# Patient Record
Sex: MALE | Race: OTHER | HISPANIC OR LATINO | Employment: FULL TIME | URBAN - METROPOLITAN AREA
[De-identification: names, ages, dates, MRNs, and addresses within clinical notes are randomized per-mention and may not be internally consistent; named-entity substitution may affect disease eponyms.]

---

## 2019-05-23 ENCOUNTER — HOSPITAL ENCOUNTER (OUTPATIENT)
Dept: LAB | Facility: HOSPITAL | Age: 39
Discharge: HOME | End: 2019-05-23

## 2019-05-23 LAB
25(OH)D3 SERPL-MCNC: 18 NG/ML (ref 30–100)
ALBUMIN SERPL-MCNC: 4.5 G/DL (ref 3.5–5)
ALBUMIN/GLOB SERPL: 1.6 {RATIO} (ref 1.4–2.6)
ALP SERPL-CCNC: 65 U/L (ref 53–128)
ALT SERPL-CCNC: 125 U/L (ref 10–40)
ANION GAP SERPL CALC-SCNC: 18 MMOL/L (ref 8–19)
AST SERPL-CCNC: 87 U/L (ref 15–50)
BASOPHILS # BLD AUTO: 0.08 10*3/UL (ref 0–0.2)
BASOPHILS NFR BLD AUTO: 1.1 % (ref 0–3)
BILIRUB SERPL-MCNC: 0.26 MG/DL (ref 0.2–1.3)
BUN SERPL-MCNC: 13 MG/DL (ref 5–25)
BUN/CREAT SERPL: 13 {RATIO} (ref 6–20)
CALCIUM SERPL-MCNC: 9.6 MG/DL (ref 8.7–10.4)
CHLORIDE SERPL-SCNC: 104 MMOL/L (ref 99–111)
CHOLEST SERPL-MCNC: 234 MG/DL (ref 107–200)
CHOLEST/HDLC SERPL: 5.9 {RATIO} (ref 3–6)
CONV ABS IMM GRAN: 0.01 10*3/UL (ref 0–0.2)
CONV CO2: 25 MMOL/L (ref 22–32)
CONV IMMATURE GRAN: 0.1 % (ref 0–1.8)
CONV TOTAL PROTEIN: 7.4 G/DL (ref 6.3–8.2)
CREAT UR-MCNC: 0.98 MG/DL (ref 0.7–1.2)
DEPRECATED RDW RBC AUTO: 46.5 FL (ref 35.1–43.9)
EOSINOPHIL # BLD AUTO: 0.29 10*3/UL (ref 0–0.7)
EOSINOPHIL # BLD AUTO: 3.9 % (ref 0–7)
ERYTHROCYTE [DISTWIDTH] IN BLOOD BY AUTOMATED COUNT: 13.2 % (ref 11.6–14.4)
EST. AVERAGE GLUCOSE BLD GHB EST-MCNC: 108 MG/DL
GFR SERPLBLD BASED ON 1.73 SQ M-ARVRAT: >60 ML/MIN/{1.73_M2}
GLOBULIN UR ELPH-MCNC: 2.9 G/DL (ref 2–3.5)
GLUCOSE SERPL-MCNC: 106 MG/DL (ref 70–99)
HBA1C MFR BLD: 16.3 G/DL (ref 14–18)
HBA1C MFR BLD: 5.4 % (ref 3.5–5.7)
HCT VFR BLD AUTO: 46.8 % (ref 42–52)
HDLC SERPL-MCNC: 40 MG/DL (ref 40–60)
LDLC SERPL CALC-MCNC: 117 MG/DL (ref 70–100)
LYMPHOCYTES # BLD AUTO: 3.12 10*3/UL (ref 1–5)
MCH RBC QN AUTO: 33.3 PG (ref 27–31)
MCHC RBC AUTO-ENTMCNC: 34.8 G/DL (ref 33–37)
MCV RBC AUTO: 95.5 FL (ref 80–96)
MONOCYTES # BLD AUTO: 0.53 10*3/UL (ref 0.2–1.2)
MONOCYTES NFR BLD AUTO: 7.2 % (ref 3–10)
NEUTROPHILS # BLD AUTO: 3.37 10*3/UL (ref 2–8)
NEUTROPHILS NFR BLD AUTO: 45.5 % (ref 30–85)
NRBC CBCN: 0 % (ref 0–0.7)
OSMOLALITY SERPL CALC.SUM OF ELEC: 297 MOSM/KG (ref 273–304)
PLATELET # BLD AUTO: 234 10*3/UL (ref 130–400)
PMV BLD AUTO: 11.5 FL (ref 9.4–12.4)
POTASSIUM SERPL-SCNC: 3.9 MMOL/L (ref 3.5–5.3)
RBC # BLD AUTO: 4.9 10*6/UL (ref 4.7–6.1)
SODIUM SERPL-SCNC: 143 MMOL/L (ref 135–147)
T4 FREE SERPL-MCNC: 1.3 NG/DL (ref 0.9–1.8)
TRIGL SERPL-MCNC: 383 MG/DL (ref 40–150)
TSH SERPL-ACNC: 1.51 M[IU]/L (ref 0.27–4.2)
VARIANT LYMPHS NFR BLD MANUAL: 42.2 % (ref 20–45)
VLDLC SERPL-MCNC: 77 MG/DL (ref 5–37)
WBC # BLD AUTO: 7.4 10*3/UL (ref 4.8–10.8)

## 2020-07-22 ENCOUNTER — HOSPITAL ENCOUNTER (OUTPATIENT)
Dept: NUCLEAR MEDICINE | Facility: HOSPITAL | Age: 40
Discharge: HOME | End: 2020-07-22

## 2021-04-08 ENCOUNTER — HOSPITAL ENCOUNTER (OUTPATIENT)
Dept: URGENT CARE | Facility: CLINIC | Age: 41
Discharge: HOME | End: 2021-04-08
Attending: EMERGENCY MEDICINE

## 2021-04-08 LAB — SARS-COV-2 RNA SPEC QL NAA+PROBE: NOT DETECTED

## 2021-04-10 LAB — BACTERIA SPEC AEROBE CULT: NORMAL

## 2023-03-07 ENCOUNTER — OFFICE VISIT (OUTPATIENT)
Dept: NEUROSURGERY | Facility: CLINIC | Age: 43
End: 2023-03-07
Payer: COMMERCIAL

## 2023-03-07 VITALS
HEART RATE: 87 BPM | WEIGHT: 220.5 LBS | HEIGHT: 72 IN | SYSTOLIC BLOOD PRESSURE: 137 MMHG | DIASTOLIC BLOOD PRESSURE: 82 MMHG | BODY MASS INDEX: 29.87 KG/M2

## 2023-03-07 DIAGNOSIS — M47.27 OSTEOARTHRITIS OF SPINE WITH RADICULOPATHY, LUMBOSACRAL REGION: ICD-10-CM

## 2023-03-07 DIAGNOSIS — M43.10 PARS DEFECT WITH SPONDYLOLISTHESIS: Primary | ICD-10-CM

## 2023-03-07 DIAGNOSIS — M43.00 PARS DEFECT WITH SPONDYLOLISTHESIS: Primary | ICD-10-CM

## 2023-03-07 PROCEDURE — 99204 OFFICE O/P NEW MOD 45 MIN: CPT | Performed by: NURSE PRACTITIONER

## 2023-03-07 NOTE — PROGRESS NOTES
"Chief Complaint  Back Pain (Low back pain that radiates down bilateral legs into feet) and Tingling (Left leg & feet)    Subjective          Shankar Ledezma who is a 42 y.o. year old male who presents to [facility] for evaluation of lumbar spine.      The patient complains of pain located in the lumbar spine.  Patients states the pain has been present for several years.  The pain came on gradually.  The pain scale level is 9.  The pain does radiate. Dermatomes are located bilaterally Lumbar at: a non-specific dermatome..  Pain will radiate into the entirety of the legs to all the toes. This worse on the left. The pain is waxing/waning and described as sharp, aching, burning and tingling.  The pain is worse at no particular time of day. Patient states prolonged standing, prolonged sitting and prolonged walking makes the pain worse.  Patient states nothing makes the pain better.    Associated Symptoms Include: Numbness, Tingling and Weakness  Conservative Interventions Include: Epidural Steroids that were ineffective. and Gabapentin that was not very effective. Recently started Lyrica though unsure this is providing much relief. He had physical therapy a few years ago which did not help. Recently received SI joint injections which provided a short duration of benefit.     Was this the result of an injury or accident?: No    History of Previous Spinal Surgery?: No    Nicotine use: smoker  (0.5-1 ppd)    BMI: Body mass index is 29.91 kg/m².    He works with [employer], performing heavy lifting of car parts.    Review of Systems   Musculoskeletal: Positive for arthralgias, back pain, gait problem and myalgias.   Neurological: Positive for weakness and numbness.   All other systems reviewed and are negative.       Objective   Vital Signs:   /82 (BP Location: Left arm, Patient Position: Sitting, Cuff Size: Large Adult)   Pulse 87   Ht 182.9 cm (72\")   Wt 100 kg (220 " lb 8 oz)   BMI 29.91 kg/m²       Physical Exam  Vitals reviewed.   Constitutional:       Appearance: Normal appearance.   Musculoskeletal:      Lumbar back: No tenderness. Negative right straight leg raise test and negative left straight leg raise test.      Right hip: No tenderness. Normal range of motion.      Left hip: No tenderness. Normal range of motion.   Neurological:      Mental Status: He is alert and oriented to person, place, and time.      Motor: Motor strength is normal.      Gait: Gait is intact.      Deep Tendon Reflexes:      Reflex Scores:       Patellar reflexes are 2+ on the right side and 2+ on the left side.       Achilles reflexes are 2+ on the right side and 2+ on the left side.       Neurologic Exam     Mental Status   Oriented to person, place, and time.   Level of consciousness: alert    Motor Exam   Muscle bulk: normal  Overall muscle tone: normal    Strength   Strength 5/5 throughout.     Sensory Exam   Light touch normal.     Gait, Coordination, and Reflexes     Gait  Gait: normal    Reflexes   Right patellar: 2+  Left patellar: 2+  Right achilles: 2+  Left achilles: 2+  Right ankle clonus: absent  Left ankle clonus: absent       Result Review :       Data reviewed: Radiologic studies MRI Lumbar Spine on 2/14/23 at [facility] personally reviewed. Multilevel degenerative changes. At L5/S1 there is bilateral L5 pars defects with 10-11 mm anterolisthesis, resultingi n severe bilateral foraminal stenosis with effacement of the exiting L5 nerve roots, though no significant spinal canal stenosis. Overall similar appearance to prior imaging from 2020.          Assessment and Plan    Diagnoses and all orders for this visit:    1. Pars defect with spondylolisthesis (Primary)    2. Osteoarthritis of spine with radiculopathy, lumbosacral region    Pt presenting for evaluation of chronic low back pain with radiculopathy. We reviewed his MRI Lumbar Spine, demonstrating L5 pars defects with  Grade 2 anterolisthesis of L5 on S1. He has degenerative changes throughout the lumbar spine. Dr. Lara reviewed his MRI. Discussed posterior lumbar fusion L5/S1. Risks and benefits discussed. Pt would be higher risk for complications due to multilevel spondylosis. We discussed expected recovery and mobility restrictions. Pt would like to proceed with surgery.     Pt would need to stop smoking.     We discussed the importance of smoking/nicotine cessation. Smoking/nicotine use has multiple health risks. In particular related to the spine, nicotine increases the incidence of lower back pain, speeds up the progression of degenerative disc disease and dramatically reduces healing after spine surgery (particularly a fusion operation).     Pt is willing to quit. He will call when he is off nicotine and we will order nicotine screening with plans to proceed with Lumbar fusion.      Follow Up   Return if symptoms worsen or fail to improve.  Patient was given instructions and counseling regarding his condition or for health maintenance advice.

## 2023-04-03 ENCOUNTER — TELEPHONE (OUTPATIENT)
Dept: NEUROSURGERY | Facility: CLINIC | Age: 43
End: 2023-04-03
Payer: COMMERCIAL

## 2023-04-03 DIAGNOSIS — Z72.0 TOBACCO ABUSE: Primary | ICD-10-CM

## 2023-04-03 NOTE — TELEPHONE ENCOUNTER
"    Caller: Shankar Ledezma    Relationship: Self    Best call back number: [phone]    What orders are you requesting (i.e. lab or imaging): NICOTINE SCREENING    Additional notes:     PATIENT CALLED IN AND STATES HE HAS NOT BEEN SMOKING FOR ABOUT 3 TO 4 WEEKS NOW.  PER LAST OVN:  \"Pt is willing to quit. He will call when he is off nicotine and we will order nicotine screening with plans to proceed with Lumbar fusion.\"    PLEASE CALL PATIENT WHEN THE ORDER HAS BEEN SENT.  THANK YOU!  "

## 2023-04-11 ENCOUNTER — LAB (OUTPATIENT)
Dept: LAB | Facility: HOSPITAL | Age: 43
End: 2023-04-11
Payer: COMMERCIAL

## 2023-04-11 DIAGNOSIS — Z72.0 TOBACCO ABUSE: ICD-10-CM

## 2023-04-11 PROCEDURE — G0480 DRUG TEST DEF 1-7 CLASSES: HCPCS

## 2023-04-11 PROCEDURE — 36415 COLL VENOUS BLD VENIPUNCTURE: CPT

## 2023-04-11 NOTE — TELEPHONE ENCOUNTER
Shankar called and asked about nicotine test. Adv order has been placed and pt can go in at any outpatient lab and have this done.     Adv pt of [facility] location and time.

## 2023-04-14 LAB
COTININE SERPL-MCNC: <1 NG/ML
NICOTINE SERPL-MCNC: <1 NG/ML

## 2023-04-20 ENCOUNTER — PREP FOR SURGERY (OUTPATIENT)
Dept: OTHER | Facility: HOSPITAL | Age: 43
End: 2023-04-20
Payer: COMMERCIAL

## 2023-04-20 DIAGNOSIS — M43.10 PARS DEFECT WITH SPONDYLOLISTHESIS: Primary | ICD-10-CM

## 2023-04-20 DIAGNOSIS — M43.00 PARS DEFECT WITH SPONDYLOLISTHESIS: Primary | ICD-10-CM

## 2023-04-20 PROBLEM — M43.16 SPONDYLOLISTHESIS OF LUMBAR REGION: Status: ACTIVE | Noted: 2023-04-20

## 2023-04-20 RX ORDER — CEFAZOLIN SODIUM 2 G/100ML
2 INJECTION, SOLUTION INTRAVENOUS ONCE
OUTPATIENT
Start: 2023-04-20 | End: 2023-04-20

## 2023-05-31 PROBLEM — M43.10 PARS DEFECT WITH SPONDYLOLISTHESIS: Status: ACTIVE | Noted: 2023-05-31

## 2023-06-29 PROBLEM — Z78.9 DECREASED ACTIVITIES OF DAILY LIVING (ADL): Status: ACTIVE | Noted: 2023-06-29

## 2023-08-08 ENCOUNTER — OFFICE VISIT (OUTPATIENT)
Dept: NEUROSURGERY | Facility: CLINIC | Age: 43
End: 2023-08-08
Payer: COMMERCIAL

## 2023-08-08 VITALS
SYSTOLIC BLOOD PRESSURE: 148 MMHG | HEIGHT: 72 IN | BODY MASS INDEX: 30.94 KG/M2 | WEIGHT: 228.4 LBS | DIASTOLIC BLOOD PRESSURE: 91 MMHG

## 2023-08-08 DIAGNOSIS — Z98.1 S/P LUMBAR SPINAL FUSION: Primary | ICD-10-CM

## 2023-08-08 DIAGNOSIS — M43.10 PARS DEFECT WITH SPONDYLOLISTHESIS: ICD-10-CM

## 2023-08-08 PROCEDURE — 99024 POSTOP FOLLOW-UP VISIT: CPT | Performed by: NEUROLOGICAL SURGERY

## 2023-08-08 NOTE — PROGRESS NOTES
Shankar Ledezma is a 43 y.o. male that presents with Back Pain       He is overall improving, now 6 weeks out from lumbar fusion. The symptoms into the arch of the foot are resolved.     Back Pain      Review of Systems   Musculoskeletal:  Positive for back pain.      Vitals:    08/08/23 1105   BP: 148/91        Physical Exam  Skin:     Comments: WHSS   Neurological:      Mental Status: He is alert.      Sensory: No sensory deficit.      Motor: No weakness.   Psychiatric:         Mood and Affect: Mood normal.           Assessment and Plan {CC Problem List  Visit Diagnosis  ROS  Review (Popup)  [facility] Health Maintenance  Quality  BestPractice  Medications  SmartSets  SnapShot Encounters  Media :23}   Problem List Items Addressed This Visit          Musculoskeletal and Injuries    Pars defect with spondylolisthesis    Overview     Added automatically from request for surgery [id number]         Relevant Orders    XR Spine Lumbar 2 or 3 View     Other Visit Diagnoses       S/P lumbar spinal fusion    -  Primary    Relevant Orders    XR Spine Lumbar 2 or 3 View          He will f/u in 6 weeks with repeat films of the lumbar films.   Follow Up {Instructions Charge Capture  Follow-up Communications :23}   Return in about 6 weeks (around 9/19/2023).

## 2023-08-24 DIAGNOSIS — M43.16 SPONDYLOLISTHESIS OF LUMBAR REGION: ICD-10-CM

## 2023-08-24 DIAGNOSIS — M43.10 PARS DEFECT WITH SPONDYLOLISTHESIS: ICD-10-CM

## 2023-08-24 RX ORDER — OXYCODONE HYDROCHLORIDE AND ACETAMINOPHEN 5; 325 MG/1; MG/1
1 TABLET ORAL EVERY 12 HOURS PRN
Qty: 30 TABLET | Refills: 0 | Status: SHIPPED | OUTPATIENT
Start: 2023-08-24

## 2023-09-14 ENCOUNTER — HOSPITAL ENCOUNTER (OUTPATIENT)
Dept: GENERAL RADIOLOGY | Facility: HOSPITAL | Age: 43
Discharge: HOME | End: 2023-09-14
Admitting: NEUROLOGICAL SURGERY
Payer: COMMERCIAL

## 2023-09-14 DIAGNOSIS — M43.10 PARS DEFECT WITH SPONDYLOLISTHESIS: ICD-10-CM

## 2023-09-14 DIAGNOSIS — Z98.1 S/P LUMBAR SPINAL FUSION: ICD-10-CM

## 2023-09-14 PROCEDURE — 72100 X-RAY EXAM L-S SPINE 2/3 VWS: CPT

## 2023-09-19 ENCOUNTER — OFFICE VISIT (OUTPATIENT)
Dept: NEUROSURGERY | Facility: CLINIC | Age: 43
End: 2023-09-19
Payer: COMMERCIAL

## 2023-09-19 VITALS
SYSTOLIC BLOOD PRESSURE: 144 MMHG | WEIGHT: 224.3 LBS | HEIGHT: 72 IN | BODY MASS INDEX: 30.38 KG/M2 | DIASTOLIC BLOOD PRESSURE: 99 MMHG

## 2023-09-19 DIAGNOSIS — Z98.1 S/P LUMBAR SPINAL FUSION: Primary | ICD-10-CM

## 2023-09-19 PROCEDURE — 99024 POSTOP FOLLOW-UP VISIT: CPT | Performed by: NEUROLOGICAL SURGERY

## 2023-09-19 NOTE — PROGRESS NOTES
Shankar Ledezma is a 43 y.o. male that presents with S/P lumbar spinal fusion       He was doing well until yesterday and the pain flared up in the lower back. No trauma, but did go to two soccer games and threw out the trash. No numbness or tingling. It is worse with being up an driving.       Review of Systems   Musculoskeletal:  Positive for back pain.   Neurological:  Negative for numbness.      Vitals:    09/19/23 1259   BP: 144/99        Physical Exam  Constitutional:       Appearance: He is normal weight.   Neurological:      Mental Status: He is alert.      Sensory: No sensory deficit.      Comments: Pain into the lower back with SLR testing on the left and strength testing on the left   Psychiatric:         Mood and Affect: Mood normal.      I have personally reviewed the patient's x-rays which show good postioning of L5-S1 instrumentation.        Assessment and Plan {CC Problem List  Visit Diagnosis  ROS  Review (Popup)  Health Maintenance  Quality  BestPractice  Medications  SmartSets  SnapShot Encounters  Media :23}   Problem List Items Addressed This Visit    None  Visit Diagnoses       S/P lumbar spinal fusion    -  Primary        He has had a flare up of lower back pain. I have asked him to be good about his activity over the next few days. He would also potentially benefit from heat or ice to the lower back.     I will see him in about 3 months. He will call back in about 3 weeks if this current flare up has not improved.     Have recommended he not lift more than 15 lbs for a period of 3 additional months. He could potentially return to work prior to 3 months from now, if restrictions are adhered to.     Follow Up {Instructions Charge Capture  Follow-up Communications :23}   No follow-ups on file.

## 2023-09-20 ENCOUNTER — TELEPHONE (OUTPATIENT)
Dept: NEUROSURGERY | Facility: CLINIC | Age: 43
End: 2023-09-20
Payer: COMMERCIAL

## 2023-09-20 NOTE — TELEPHONE ENCOUNTER
"PATIENT: POLY PARKER    PROVIDER: DR. JUAN    REASON: WORK NOTE    PATIENT WILL  ASAP    CALL BACK # [phone]    PATIENT CALLED STATING THAT HIS EMPLOYER IS REQUESTING A NEW WORK NOTE THAT IS MORE EXPLICIT WITH WHETHER OR NOT THE PATIENT CAN RETURN TO WORK NOW, OR IF NOT, WHEN EXACTLY HE WILL BE ABLE TO RETURN TO WORK, AND WHAT ARE THE EXACT QUALIFICATION AND LIMITATIONS HE HAS ONCE HE DOES RETURN TO WORK. THE CURRENT NOTE HAS IT THAT HE CAN \"POTENTIALLY RETURN TO WORK IN THE NEXT THREE MONTHS\" BUT, HIS EMPLOYER THINKS THIS IS TOO VAGUE AND WANTS A YES OR NO AND MORE CLARITY. PLEASE PREPARE THE NOTE FOR THE PATIENT TO COME AND PICK IT UP ASAP. THANKS.  "

## 2023-09-20 NOTE — TELEPHONE ENCOUNTER
The patient can return to work with the restriction of no lifting greater than 15 lbs. Minimal bending or twisting at the waist and the ability to sit down during the day as needed.

## 2023-09-22 NOTE — TELEPHONE ENCOUNTER
PATIENT CALLED BACK AND STATES THE RTW NOTE WAS NOT ACCEPTABLE TO HIS EMPLOYER.    EMPLOYER WANTS TIME FRAME FOR HOW LONG ALL RESTRICTIONS ARE.    THEY ARE CURRENTLY NOT LETTING PATIENT RETURN TO WORK UNTIL THAT IS CLARIFIED.    PLEASE CALL TO ADVISE

## 2023-10-13 DIAGNOSIS — M43.10 PARS DEFECT WITH SPONDYLOLISTHESIS: ICD-10-CM

## 2023-10-13 DIAGNOSIS — M43.16 SPONDYLOLISTHESIS OF LUMBAR REGION: ICD-10-CM

## 2023-10-13 RX ORDER — OXYCODONE HYDROCHLORIDE AND ACETAMINOPHEN 5; 325 MG/1; MG/1
1 TABLET ORAL EVERY 12 HOURS PRN
Qty: 6 TABLET | Refills: 0 | Status: SHIPPED | OUTPATIENT
Start: 2023-10-13

## 2023-10-13 NOTE — TELEPHONE ENCOUNTER
Patient called stating that he has went back to work and his pain is unbearable at times. Over the counter pain medication doesn't seem to help.    Shankar is requesting a refill of his pain medication.

## 2023-12-19 ENCOUNTER — OFFICE VISIT (OUTPATIENT)
Dept: NEUROSURGERY | Facility: CLINIC | Age: 43
End: 2023-12-19
Payer: COMMERCIAL

## 2023-12-19 VITALS
HEIGHT: 72 IN | DIASTOLIC BLOOD PRESSURE: 91 MMHG | BODY MASS INDEX: 29.96 KG/M2 | SYSTOLIC BLOOD PRESSURE: 140 MMHG | WEIGHT: 221.2 LBS

## 2023-12-19 DIAGNOSIS — Z98.1 S/P LUMBAR SPINAL FUSION: Primary | ICD-10-CM

## 2023-12-19 NOTE — PROGRESS NOTES
Shankar Ledezma is a 43 y.o. male that presents with S/P lumbar spinal fusion       He is overall doing well. He danced some with his daughter and was sore after. He is overall happy with the result of the surgery.         Review of Systems   Musculoskeletal:  Positive for back pain.        Vitals:    12/19/23 1521   BP: 140/91        Physical Exam  Constitutional:       Comments: BMI 30   Neurological:      Mental Status: He is alert.             Assessment and Plan {CC Problem List  Visit Diagnosis  ROS  Review (Popup)  Health Maintenance  Quality  BestPractice  Medications  SmartSets  SnapShot Encounters  Media :23}   Problem List Items Addressed This Visit    None  Visit Diagnoses       S/P lumbar spinal fusion    -  Primary    Relevant Orders    XR Spine Lumbar 2 or 3 View          He will f/u in 6 months or sooner with any new issues.  Follow Up {Instructions Charge Capture  Follow-up Communications :23}   Return in about 6 months (around 6/19/2024).

## 2024-02-20 ENCOUNTER — TELEPHONE (OUTPATIENT)
Dept: NEUROSURGERY | Facility: CLINIC | Age: 44
End: 2024-02-20
Payer: COMMERCIAL

## 2024-02-20 NOTE — TELEPHONE ENCOUNTER
PT CALLED: HE IS NEEDING AN UPDATED WORK NOTE WITH THE NEW RESTRICTIONS DR. LARA GAVE HIM IN 12/19/23. PLEASE ADVISE! THANKS!    Office Visit with Justino Lara MD (12/19/2023)     SX DATE: 6/26/23 Surgery with Justino Lara MD (06/26/2023)     PT CONTACT: [phone]  BEST TIME TO CALL: ANYTIME

## 2024-05-14 ENCOUNTER — HOSPITAL ENCOUNTER (OUTPATIENT)
Dept: GENERAL RADIOLOGY | Facility: HOSPITAL | Age: 44
Discharge: HOME | End: 2024-05-14
Admitting: NEUROLOGICAL SURGERY
Payer: COMMERCIAL

## 2024-05-14 DIAGNOSIS — Z98.1 S/P LUMBAR SPINAL FUSION: ICD-10-CM

## 2024-05-14 PROCEDURE — 72100 X-RAY EXAM L-S SPINE 2/3 VWS: CPT

## 2024-06-18 ENCOUNTER — OFFICE VISIT (OUTPATIENT)
Dept: NEUROSURGERY | Facility: CLINIC | Age: 44
End: 2024-06-18
Payer: COMMERCIAL

## 2024-06-18 VITALS
HEIGHT: 72 IN | WEIGHT: 218.4 LBS | BODY MASS INDEX: 29.58 KG/M2 | SYSTOLIC BLOOD PRESSURE: 142 MMHG | DIASTOLIC BLOOD PRESSURE: 86 MMHG

## 2024-06-18 DIAGNOSIS — Z98.1 S/P LUMBAR SPINAL FUSION: Primary | ICD-10-CM

## 2024-06-18 PROCEDURE — 99213 OFFICE O/P EST LOW 20 MIN: CPT | Performed by: NEUROLOGICAL SURGERY

## 2024-06-18 NOTE — PROGRESS NOTES
Shankar Ledezma is a 44 y.o. male that presents with S/P lumbar fusion       He is overall doing well, but the last 2 days he has had some increased pain in the right buttock shooting upward. He is nearly a year out from his L5-S1 fusion.         Vitals:    06/18/24 1524   BP: 142/86        Physical Exam  Constitutional:       Appearance: He is normal weight.   Neurological:      Mental Status: He is alert.      Motor: No weakness.   Psychiatric:         Mood and Affect: Mood normal.        I have personally interpreted the patient's x-rays which shows good positioning of his instrumentation.        Assessment and Plan {CC Problem List  Visit Diagnosis  ROS  Review (Popup)  Health Maintenance  Quality  BestPractice  Medications  SmartSets  SnapShot Encounters  Media :23}   Problem List Items Addressed This Visit    None  Visit Diagnoses       S/P lumbar spinal fusion    -  Primary        He will call with any new or worsened pain.     I would recommend he not frequently lift greater than 30 lbs. He will work to avoid excessive bending and twisting at the waist.     Follow Up {Instructions Charge Capture  Follow-up Communications :23}   No follow-ups on file.

## 2025-01-09 ENCOUNTER — APPOINTMENT (OUTPATIENT)
Dept: GENERAL RADIOLOGY | Facility: HOSPITAL | Age: 45
End: 2025-01-09
Payer: OTHER MISCELLANEOUS

## 2025-01-09 ENCOUNTER — HOSPITAL ENCOUNTER (EMERGENCY)
Facility: HOSPITAL | Age: 45
Discharge: HOME | End: 2025-01-09
Attending: EMERGENCY MEDICINE
Payer: OTHER MISCELLANEOUS

## 2025-01-09 VITALS
HEART RATE: 76 BPM | SYSTOLIC BLOOD PRESSURE: 134 MMHG | TEMPERATURE: 98.1 F | HEIGHT: 72 IN | WEIGHT: 235.01 LBS | BODY MASS INDEX: 31.83 KG/M2 | DIASTOLIC BLOOD PRESSURE: 83 MMHG | RESPIRATION RATE: 18 BRPM | OXYGEN SATURATION: 98 %

## 2025-01-09 DIAGNOSIS — M54.50 LUMBAR BACK PAIN: Primary | ICD-10-CM

## 2025-01-09 DIAGNOSIS — M54.32 LEFT SIDED SCIATICA: ICD-10-CM

## 2025-01-09 PROCEDURE — 99283 EMERGENCY DEPT VISIT LOW MDM: CPT

## 2025-01-09 PROCEDURE — 96374 THER/PROPH/DIAG INJ IV PUSH: CPT

## 2025-01-09 PROCEDURE — 72100 X-RAY EXAM L-S SPINE 2/3 VWS: CPT

## 2025-01-09 PROCEDURE — 96372 THER/PROPH/DIAG INJ SC/IM: CPT

## 2025-01-09 PROCEDURE — 25010000002 KETOROLAC TROMETHAMINE PER 15 MG

## 2025-01-09 PROCEDURE — 25010000002 TRIAMCINOLONE PER 10 MG

## 2025-01-09 PROCEDURE — 25010000002 ORPHENADRINE CITRATE PER 60 MG

## 2025-01-09 RX ORDER — KETOROLAC TROMETHAMINE 10 MG/1
10 TABLET, FILM COATED ORAL EVERY 6 HOURS PRN
Qty: 15 TABLET | Refills: 0 | Status: SHIPPED | OUTPATIENT
Start: 2025-01-09

## 2025-01-09 RX ORDER — PREDNISONE 20 MG/1
20 TABLET ORAL DAILY
Qty: 5 TABLET | Refills: 0 | Status: SHIPPED | OUTPATIENT
Start: 2025-01-09

## 2025-01-09 RX ORDER — TRIAMCINOLONE ACETONIDE 40 MG/ML
40 INJECTION, SUSPENSION INTRA-ARTICULAR; INTRAMUSCULAR ONCE
Status: COMPLETED | OUTPATIENT
Start: 2025-01-09 | End: 2025-01-09

## 2025-01-09 RX ORDER — KETOROLAC TROMETHAMINE 30 MG/ML
60 INJECTION, SOLUTION INTRAMUSCULAR; INTRAVENOUS ONCE
Status: DISCONTINUED | OUTPATIENT
Start: 2025-01-09 | End: 2025-01-09

## 2025-01-09 RX ORDER — ORPHENADRINE CITRATE 30 MG/ML
60 INJECTION INTRAMUSCULAR; INTRAVENOUS ONCE
Status: COMPLETED | OUTPATIENT
Start: 2025-01-09 | End: 2025-01-09

## 2025-01-09 RX ORDER — KETOROLAC TROMETHAMINE 30 MG/ML
30 INJECTION, SOLUTION INTRAMUSCULAR; INTRAVENOUS ONCE
Status: COMPLETED | OUTPATIENT
Start: 2025-01-09 | End: 2025-01-09

## 2025-01-09 RX ORDER — ORPHENADRINE CITRATE 100 MG/1
100 TABLET ORAL 2 TIMES DAILY
Qty: 10 TABLET | Refills: 0 | Status: SHIPPED | OUTPATIENT
Start: 2025-01-09

## 2025-01-09 RX ADMIN — TRIAMCINOLONE ACETONIDE 40 MG: 40 SUSPENSION INTRA-ARTERIAL; INTRAMUSCULAR at 14:09

## 2025-01-09 RX ADMIN — ORPHENADRINE CITRATE 60 MG: 60 INJECTION INTRAMUSCULAR; INTRAVENOUS at 14:09

## 2025-01-09 RX ADMIN — KETOROLAC TROMETHAMINE 30 MG: 30 INJECTION, SOLUTION INTRAMUSCULAR; INTRAVENOUS at 14:04

## 2025-01-09 NOTE — DISCHARGE INSTRUCTIONS
Home.  I have sent prescriptions to your pharmacy for pickup.  Please pick these up and take as needed for symptom relief.  Do not start your steroids until tomorrow because you have been given a dose here in the emergency department.  Avoid lifting greater than 10 pounds or bending and twisting excessively until your symptoms have resolved.  Avoid prolonged periods of standing or walking.  Apply ice to your back at least 4 times a day for 15 to 20 minutes each time.    Call Dr. Lara to schedule an appointment for follow-up in 1 week for further evaluation due to your recent surgery status.    If symptoms worsen, change in presentation, or you develop new symptoms please seek medical attention or return to the ED for further evaluation.

## 2025-01-09 NOTE — ED PROVIDER NOTES
Time: 11:27 AM EST  Date of encounter:  1/9/2025  Independent Historian/Clinical History and Information was obtained by:   Patient    History is limited by: N/A    Chief Complaint   Patient presents with    Back Pain         History of Present Illness:  Patient is a 44 y.o. year old male who presents to the emergency department for evaluation of low back pain with left leg radiation.  Patient states he reached for a 50 pound bag of salt and felt a pop in his lumbar spine as well as instant tingling in his hand.  Patient states his pain was instant in presentation and he felt like it went down both legs and into both hips at the time of injury, but states now it is only radiating down his left leg.  Denies fall.  Patient is s/p lumbar fusion with Dr. Lara.    Patient Care Team  Primary Care Provider: Provider, No Known    Past Medical History:     Allergies   Allergen Reactions    Morphine Irritability     Past Medical History:   Diagnosis Date    GERD (gastroesophageal reflux disease)     Headache     Low back pain     SEE'S PAIN MANAGEMENT    Lumbosacral disc disease     Murmur     AS CHILD NO PROBLEMS NOW -NO CARDIOLOGIST    Sciatic nerve pain     Thoracic disc disorder      Past Surgical History:   Procedure Laterality Date    BACK SURGERY  06/26/23    CARDIAC SURGERY  1989    for heart murmur    CAROTID ENDARTERECTOMY      LUMBAR LAMINECTOMY WITH FUSION N/A 06/26/2023    Procedure: LUMBAR LAMINECTOMY AND FUSION USING PEDICLE SCREW INSTRUMENTATION AND LOCAL AUTOGRAFT BONE, LUMBAR 5-SACRAL 1;  Surgeon: Justino Lara MD;  Location: [facility] (formerly [facility]);  Service: Neurosurgery;  Laterality: N/A;    SPINAL FUSION  6/26/2023     Family History   Problem Relation Age of Onset    Asthma Mother     Malig Hyperthermia Neg Hx        Home Medications:  Prior to Admission medications    Medication Sig Start Date End Date Taking? Authorizing Provider   omeprazole (priLOSEC) 40 MG capsule Take 1 capsule by mouth Daily.     "Provider, MD Luciana   oxyCODONE-acetaminophen (PERCOCET) 5-325 MG per tablet Take 1 tablet by mouth Every 12 (Twelve) Hours As Needed for Severe Pain (Pain).  Patient not taking: Reported on 6/18/2024 10/13/23   Kimmy Camara APRN        Social History:   Social History     Tobacco Use    Smoking status: Former     Current packs/day: 0.50     Average packs/day: 0.5 packs/day     Types: Cigarettes     Start date: 9/1/1993     Quit date: 2/1/2023    Smokeless tobacco: Never   Vaping Use    Vaping status: Never Used   Substance Use Topics    Alcohol use: Yes     Alcohol/week: 3.0 standard drinks of alcohol     Types: 3 Glasses of wine per week     Comment: Occ    Drug use: Never         Review of Systems:  Review of Systems   Constitutional: Negative.    HENT: Negative.     Eyes: Negative.    Respiratory: Negative.     Cardiovascular: Negative.    Gastrointestinal: Negative.    Endocrine: Negative.    Genitourinary: Negative.    Musculoskeletal:  Positive for arthralgias and back pain.   Skin: Negative.    Allergic/Immunologic: Negative.    Neurological: Negative.    Hematological: Negative.    Psychiatric/Behavioral: Negative.          Physical Exam:  /97   Pulse 72   Temp 98.1 °F (36.7 °C) (Oral)   Resp 18   Ht 182.9 cm (72.01\")   Wt 107 kg (235 lb 0.2 oz)   SpO2 95%   BMI 31.87 kg/m²         Physical Exam  Vitals and nursing note reviewed.   Constitutional:       General: He is not in acute distress.     Appearance: Normal appearance. He is not toxic-appearing.   HENT:      Head: Normocephalic and atraumatic.      Jaw: There is normal jaw occlusion.      Nose: Nose normal.      Mouth/Throat:      Mouth: Mucous membranes are moist.      Pharynx: Oropharynx is clear.   Eyes:      General: Lids are normal.      Extraocular Movements: Extraocular movements intact.      Conjunctiva/sclera: Conjunctivae normal.      Pupils: Pupils are equal, round, and reactive to light. "   Cardiovascular:      Rate and Rhythm: Normal rate and regular rhythm.      Pulses: Normal pulses.      Heart sounds: Normal heart sounds.   Pulmonary:      Effort: Pulmonary effort is normal. No respiratory distress.      Breath sounds: Normal breath sounds. No wheezing or rhonchi.   Abdominal:      General: Abdomen is flat. Bowel sounds are normal.      Palpations: Abdomen is soft.      Tenderness: There is no abdominal tenderness. There is no right CVA tenderness, left CVA tenderness, guarding or rebound.   Musculoskeletal:         General: Normal range of motion.      Cervical back: Normal range of motion and neck supple.      Lumbar back: Spasms and tenderness present. Decreased range of motion. Positive left straight leg raise test. Negative right straight leg raise test.      Right lower leg: No edema.      Left lower leg: No edema.   Skin:     General: Skin is warm and dry.   Neurological:      Mental Status: He is alert and oriented to person, place, and time. Mental status is at baseline.   Psychiatric:         Mood and Affect: Mood normal.                      Medical Decision Making:      Comorbidities that affect care:    GERD, low back pain, sciatica.    External Notes reviewed:    Previous Clinic Note: I reviewed patient's last note from follow-up lumbar spinal fusion with Dr. Justino Lara      The following orders were placed and all results were independently analyzed by me:  Orders Placed This Encounter   Procedures    XR Spine Lumbar 2 or 3 View       Medications Given in the Emergency Department:  Medications   ketorolac (TORADOL) injection 60 mg (has no administration in time range)   orphenadrine (NORFLEX) injection 60 mg (has no administration in time range)   triamcinolone acetonide (KENALOG-40) injection 40 mg (has no administration in time range)        ED Course:    The patient was initially evaluated in the triage area where orders were placed. The patient was later dispositioned by  Fabi Fitzgerald, JING.      The patient was advised to stay for completion of workup which includes but is not limited to communication of labs and radiological results, reassessment and plan. The patient was advised that leaving prior to disposition by a provider could result in critical findings that are not communicated to the patient.          Labs:    Lab Results (last 24 hours)       ** No results found for the last 24 hours. **             Imaging:    XR Spine Lumbar 2 or 3 View    Result Date: 1/9/2025  XR SPINE LUMBAR 2 OR 3 VW Date of Exam: 1/9/2025 11:40 AM EST Indication: Lumbar, Back Pain Comparison: Lumbar spine 3 views dated 5/14/2024 Findings: There is been previous fusion of L5-S1 utilizing bilateral juvencio and screw constructs. There is a 0.6 cm retrolisthesis of L4 on L5. There is a 1.7 cm anterolisthesis of L5 on S1. Findings appear similar to the previous study. No hardware complication is identified. Mild to moderate degenerative disc changes are noted throughout the visualized spine.     Impression: Previous L5-S1 fusion. Stable malalignments at L4-5 and L5-S1. Mild to moderate multilevel degenerative disc disease Electronically Signed: Jeff Raymundo MD  1/9/2025 12:01 PM EST  Workstation ID: [id number]       Differential Diagnosis and Discussion:      Back Pain: The patient presents with back pain. My differential diagnosis includes but is not limited to acute spinal epidural abscess, acute spinal epidural bleed, cauda equina syndrome, abdominal aortic aneurysm, aortic dissection, kidney stone, pyelonephritis, musculoskeletal back pain, spinal fracture, and osteoarthritis.     PROCEDURES:    X-ray were performed in the emergency department and all X-ray impressions were independently interpreted by me.    No orders to display        Procedures    MDM  Number of Diagnoses or Management Options  Left sided sciatica  Lumbar back pain  Diagnosis management comments: The patient's symptoms are consistent  with musculoskeletal back pain. The patient is now resting comfortably, feels better, is alert, talkative, interactive and in no distress. The repeat examination is unremarkable and benign. The patient is neurologically intact and is ambulatory in the ED. The patient has no fever, no bowel or bladder incontinence, no saddle anesthesia, and is otherwise alert and well appearing. The history, physical exam, and diagnostics (if any) do not suggest the presence of acute spinal epidural abscess, acute spinal epidural bleed, cauda equina syndrome, abdominal aortic aneurysm, aortic dissection or other process requiring further testing, treatment or consultation in the emergency department. The vital signs have been stable. The patient's condition is stable and appropriate for discharge. The patient will pursue further outpatient evaluation with the primary care physician or other designated for consulting position as indicated in the discharge instructions.  Imaging reveals no acute findings.  Patient is s/p lumbar fusion and all hardware is in good place.  Patient was given medication in the ED and prescriptions sent to his pharmacy.  He was encouraged to follow-up with Dr. Lara for further management of his symptoms or return to the ED if it gets worse or experiences other concerning symptoms.       Amount and/or Complexity of Data Reviewed  Tests in the radiology section of CPT®: reviewed and ordered  Tests in the medicine section of CPT®: ordered and reviewed  Review and summarize past medical records: yes         Patient Care Considerations:    NARCOTICS: I considered prescribing opiate pain medication as an outpatient, however no narcotic administration was required for pain control in the ED.      Consultants/Shared Management Plan:    None    Social Determinants of Health:    Patient is independent, reliable, and has access to care.       Disposition and Care Coordination:    Discharged: The patient is suitable  and stable for discharge with no need for consideration of admission.    I have explained the patient´s condition, diagnoses and treatment plan based on the information available to me at this time. I have answered questions and addressed any concerns. The patient has a good  understanding of the patient´s diagnosis, condition, and treatment plan as can be expected at this point. The vital signs have been stable. The patient´s condition is stable and appropriate for discharge from the emergency department.      The patient will pursue further outpatient evaluation with the primary care physician or other designated or consulting physician as outlined in the discharge instructions. They are agreeable to this plan of care and follow-up instructions have been explained in detail. The patient has received these instructions in written format and has expressed an understanding of the discharge instructions. The patient is aware that any significant change in condition or worsening of symptoms should prompt an immediate return to this or the closest emergency department or call to 911.  I have explained discharge medications and the need for follow up with the patient/caretakers. This was also printed in the discharge instructions. Patient was discharged with the following medications and follow up:      Medication List        New Prescriptions      ketorolac 10 MG tablet  Commonly known as: TORADOL  Take 1 tablet by mouth Every 6 (Six) Hours As Needed for Moderate Pain.     orphenadrine 100 MG 12 hr tablet  Commonly known as: NORFLEX  Take 1 tablet by mouth 2 (Two) Times a Day.     predniSONE 20 MG tablet  Commonly known as: DELTASONE  Take 1 tablet by mouth Daily.               Where to Get Your Medications        [facility] [address] [facility] [place] [facility] [address] [facility] [address] - [phone]  - [phone]   [address] [facility] [place] [address]      Phone:  [phone]   ketorolac 10 MG tablet  orphenadrine 100 MG 12 hr tablet  predniSONE 20 MG tablet      Provider, No Known  [place] [address]    Call   As needed    Justino Lara MD  [address] [place] [address]  [phone]    Call   For follow-up in 1 week       Final diagnoses:   Lumbar back pain   Left sided sciatica        ED Disposition       ED Disposition   Discharge    Condition   Stable    Comment   --               This medical record created using voice recognition software.             Fabi Fitzgerald, APRN  01/09/25 1247

## 2025-01-09 NOTE — Clinical Note
[facility] EMERGENCY ROOM  [address]  [place] [address]  Phone: [phone]  Fax: [phone]    Shankar Ledezma was seen and treated in our emergency department on 1/9/2025.  He may return to work on 01/11/2025.         Thank you for choosing [facility].    Saleem Chambers MD

## 2025-01-28 ENCOUNTER — TELEPHONE (OUTPATIENT)
Dept: NEUROSURGERY | Facility: CLINIC | Age: 45
End: 2025-01-28
Payer: COMMERCIAL

## 2025-01-28 NOTE — TELEPHONE ENCOUNTER
Xrays look good. Happy to see in clinic if not improving quickly. In the short term, minimized lifting/bending/twisting.

## 2025-01-28 NOTE — TELEPHONE ENCOUNTER
Caller: Shankar Ledezma A    Relationship: Self    Best call back number: [phone]    Caller requesting test results: PATIENT    What test was performed: XRAYS    When was the test performed: 1/9/25    Where was the test performed:  EMERGENCY    Additional notes: PATIENT IS HAVING SEVERE LEFT-SIDED BACK PAIN WHICH HE HAS BEEN SEEN IN THE ER FOR. IT IS PUTTING HIM OUT OF WORK, AS HE IS HAVING DIFFICULTY WALKING/STANDING. PATIENT WOULD LIKE DR JUAN TO LOOK AT IMAGING TO ADVISE ON HOW TO PROCEED.

## 2025-06-30 ENCOUNTER — HOSPITAL ENCOUNTER (EMERGENCY)
Facility: HOSPITAL | Age: 45
Discharge: HOME | End: 2025-06-30
Attending: EMERGENCY MEDICINE | Admitting: EMERGENCY MEDICINE
Payer: COMMERCIAL

## 2025-06-30 ENCOUNTER — APPOINTMENT (OUTPATIENT)
Dept: MRI IMAGING | Facility: HOSPITAL | Age: 45
End: 2025-06-30
Payer: COMMERCIAL

## 2025-06-30 VITALS
WEIGHT: 230 LBS | DIASTOLIC BLOOD PRESSURE: 87 MMHG | RESPIRATION RATE: 20 BRPM | HEART RATE: 70 BPM | BODY MASS INDEX: 31.15 KG/M2 | HEIGHT: 72 IN | OXYGEN SATURATION: 97 % | TEMPERATURE: 99 F | SYSTOLIC BLOOD PRESSURE: 139 MMHG

## 2025-06-30 DIAGNOSIS — G89.29 ACUTE ON CHRONIC BACK PAIN: Primary | ICD-10-CM

## 2025-06-30 DIAGNOSIS — M54.9 ACUTE ON CHRONIC BACK PAIN: Primary | ICD-10-CM

## 2025-06-30 PROCEDURE — 25010000002 KETOROLAC TROMETHAMINE PER 15 MG: Performed by: EMERGENCY MEDICINE

## 2025-06-30 PROCEDURE — 99284 EMERGENCY DEPT VISIT MOD MDM: CPT

## 2025-06-30 PROCEDURE — 25010000002 HYDROMORPHONE 1 MG/ML SOLUTION: Performed by: EMERGENCY MEDICINE

## 2025-06-30 PROCEDURE — 96375 TX/PRO/DX INJ NEW DRUG ADDON: CPT

## 2025-06-30 PROCEDURE — 96374 THER/PROPH/DIAG INJ IV PUSH: CPT

## 2025-06-30 PROCEDURE — 25010000002 DEXAMETHASONE SODIUM PHOSPHATE 10 MG/ML SOLUTION: Performed by: EMERGENCY MEDICINE

## 2025-06-30 PROCEDURE — 72148 MRI LUMBAR SPINE W/O DYE: CPT

## 2025-06-30 RX ORDER — KETOROLAC TROMETHAMINE 30 MG/ML
30 INJECTION, SOLUTION INTRAMUSCULAR; INTRAVENOUS ONCE
Status: COMPLETED | OUTPATIENT
Start: 2025-06-30 | End: 2025-06-30

## 2025-06-30 RX ORDER — PREDNISONE 20 MG/1
TABLET ORAL
Qty: 7 TABLET | Refills: 0 | Status: SHIPPED | OUTPATIENT
Start: 2025-06-30 | End: 2025-07-06

## 2025-06-30 RX ORDER — DEXAMETHASONE SODIUM PHOSPHATE 10 MG/ML
10 INJECTION, SOLUTION INTRAMUSCULAR; INTRAVENOUS ONCE
Status: COMPLETED | OUTPATIENT
Start: 2025-06-30 | End: 2025-06-30

## 2025-06-30 RX ORDER — OXYCODONE AND ACETAMINOPHEN 5; 325 MG/1; MG/1
1 TABLET ORAL EVERY 4 HOURS PRN
Qty: 12 TABLET | Refills: 0 | Status: CANCELLED | OUTPATIENT
Start: 2025-06-30

## 2025-06-30 RX ORDER — KETOROLAC TROMETHAMINE 10 MG/1
10 TABLET, FILM COATED ORAL EVERY 6 HOURS PRN
Qty: 15 TABLET | Refills: 0 | Status: SHIPPED | OUTPATIENT
Start: 2025-06-30

## 2025-06-30 RX ADMIN — HYDROMORPHONE HYDROCHLORIDE 1 MG: 1 INJECTION, SOLUTION INTRAMUSCULAR; INTRAVENOUS; SUBCUTANEOUS at 10:26

## 2025-06-30 RX ADMIN — KETOROLAC TROMETHAMINE 30 MG: 30 INJECTION, SOLUTION INTRAMUSCULAR; INTRAVENOUS at 10:26

## 2025-06-30 RX ADMIN — DEXAMETHASONE SODIUM PHOSPHATE 10 MG: 10 INJECTION INTRAMUSCULAR; INTRAVENOUS at 10:26

## 2025-06-30 NOTE — DISCHARGE INSTRUCTIONS
Your MRI today did not show any chronic changes where you had your fusion.  There is a mild disc bulge at the L4 level which could be causing worsening pain in the lower legs.  There is no signs of any acute emergent surgical complications on your back.  Take the Toradol and prednisone as prescribed.  May take Percocet as needed for pain control as well.  Follow-up with your PCP if symptoms are not improving to discuss potential pain management.  Return to the emergency department for chest pain, shortness of breath, loss of bowel or bladder function, any other new or worsening symptoms concerning to you.

## 2025-06-30 NOTE — Clinical Note
[facility] EMERGENCY ROOM  [address] [place] [address]  Phone: [phone]  Fax: [phone]    Shankar Ledezma was seen and treated in our emergency department on 6/30/2025.  He may return to work on 07/01/2025.         Thank you for choosing [facility].    Osman Madden MD

## 2025-06-30 NOTE — ED PROVIDER NOTES
Time: 9:52 AM EDT  Date of encounter:  6/30/2025  Independent Historian/Clinical History and Information was obtained by:   Patient    History is limited by: N/A    Chief Complaint: Back pain      History of Present Illness:  Patient is a 45 y.o. year old male who presents to the emergency department for evaluation of back pain.  Patient presents to the emergency department today for evaluation of lumbar back pain.  He states he has a history of a fusion at the L4-L5 area.  On Thursday he states that woke up with some aching pain that progressively worsened and on Friday he had to leave work because of the pain.  He states he woke up this morning and said severe pain they both of his legs feel weak.  Has not had any bowel or bladder incontinence.  Patient states he is trying to take Tylenol and ibuprofen without any relief.  Denies any fever.  Denies any IV drug use.      Patient Care Team  Primary Care Provider: Provider, No Known    Past Medical History:     Allergies   Allergen Reactions    Morphine Irritability     Past Medical History:   Diagnosis Date    GERD (gastroesophageal reflux disease)     Headache     Low back pain     SEE'S PAIN MANAGEMENT    Lumbosacral disc disease     Murmur     AS CHILD NO PROBLEMS NOW -NO CARDIOLOGIST    Sciatic nerve pain     Thoracic disc disorder      Past Surgical History:   Procedure Laterality Date    BACK SURGERY  06/26/23    CARDIAC SURGERY  1989    for heart murmur    CAROTID ENDARTERECTOMY      LUMBAR LAMINECTOMY WITH FUSION N/A 06/26/2023    Procedure: LUMBAR LAMINECTOMY AND FUSION USING PEDICLE SCREW INSTRUMENTATION AND LOCAL AUTOGRAFT BONE, LUMBAR 5-SACRAL 1;  Surgeon: Justino Lara MD;  Location: [facility];  Service: Neurosurgery;  Laterality: N/A;    SPINAL FUSION  6/26/2023     Family History   Problem Relation Age of Onset    Asthma Mother     Malig Hyperthermia Neg Hx        Home Medications:  Prior to Admission medications    Medication Sig Start Date End  "Date Taking? Authorizing Provider   ketorolac (TORADOL) 10 MG tablet Take 1 tablet by mouth Every 6 (Six) Hours As Needed for Moderate Pain. 1/9/25   Fabi Fitzgerald APRN   omeprazole (priLOSEC) 40 MG capsule Take 1 capsule by mouth Daily.    Provider, MD Luciana   orphenadrine (NORFLEX) 100 MG 12 hr tablet Take 1 tablet by mouth 2 (Two) Times a Day. 1/9/25   Fabi Fitzgerald APRN   oxyCODONE-acetaminophen (PERCOCET) 5-325 MG per tablet Take 1 tablet by mouth Every 12 (Twelve) Hours As Needed for Severe Pain (Pain).  Patient not taking: Reported on 6/18/2024 10/13/23   Kimmy Camara APRN   predniSONE (DELTASONE) 20 MG tablet Take 1 tablet by mouth Daily. 1/9/25   Fabi Fitzgerald APRN        Social History:   Social History     Tobacco Use    Smoking status: Former     Current packs/day: 0.50     Average packs/day: 0.5 packs/day for 0.5 years (0.2 ttl pk-yrs)     Types: Cigarettes     Start date: 9/1/1993     Quit date: 2/1/2023    Smokeless tobacco: Never   Vaping Use    Vaping status: Never Used   Substance Use Topics    Alcohol use: Yes     Alcohol/week: 3.0 standard drinks of alcohol     Types: 3 Glasses of wine per week     Comment: Occ    Drug use: Never         Review of Systems:  Review of Systems   Constitutional:  Negative for fever.   Genitourinary:  Negative for difficulty urinating.   Musculoskeletal:  Positive for back pain.   Neurological:  Positive for weakness.        Physical Exam:  /87 (BP Location: Left arm, Patient Position: Sitting)   Pulse 69   Temp 98.1 °F (36.7 °C) (Oral)   Resp 20   Ht 182.9 cm (72\")   Wt 104 kg (230 lb)   SpO2 97%   BMI 31.19 kg/m²     Physical Exam  Vitals and nursing note reviewed.   Constitutional:       General: He is in acute distress.      Appearance: Normal appearance. He is normal weight. He is not ill-appearing, toxic-appearing or diaphoretic.   HENT:      Head: Normocephalic and atraumatic.      Nose: Nose " normal.   Eyes:      Conjunctiva/sclera: Conjunctivae normal.   Cardiovascular:      Rate and Rhythm: Normal rate and regular rhythm.      Pulses:           Dorsalis pedis pulses are 2+ on the right side and 2+ on the left side.   Pulmonary:      Effort: Pulmonary effort is normal.      Breath sounds: Normal breath sounds.   Abdominal:      General: Abdomen is flat. Bowel sounds are normal. There is no distension.      Palpations: Abdomen is soft. There is no mass.      Tenderness: There is no abdominal tenderness. There is no guarding or rebound.      Hernia: No hernia is present.   Musculoskeletal:      Cervical back: Normal range of motion and neck supple.      Thoracic back: Normal.      Lumbar back: Tenderness and bony tenderness present. No swelling, edema, deformity, signs of trauma, lacerations or spasms. Decreased range of motion. Positive right straight leg raise test and positive left straight leg raise test. No scoliosis.   Skin:     General: Skin is warm and dry.   Neurological:      General: No focal deficit present.      Mental Status: He is alert and oriented to person, place, and time.      Comments: Weakness of bilateral lower extremity   Psychiatric:         Mood and Affect: Mood normal.         Behavior: Behavior normal.         Thought Content: Thought content normal.         Judgment: Judgment normal.                  Medical Decision Making:    Comorbidities that affect care:    GERD, sciatica, lumbar fusion    External Notes reviewed:    Previous Radiological Studies: X-ray of the lumbar spine on 1 - 9 - 25      The following orders were placed and all results were independently analyzed by me:  Orders Placed This Encounter   Procedures    MRI Lumbar Spine Without Contrast       Medications Given in the Emergency Department:  Medications   ketorolac (TORADOL) injection 30 mg (30 mg Intravenous Given 6/30/25 1026)   HYDROmorphone (DILAUDID) injection 1 mg (1 mg Intravenous Given 6/30/25 1026)    dexAMETHasone sodium phosphate injection 10 mg (10 mg Intravenous Given 6/30/25 1026)        ED Course:    ED Course as of 06/30/25 1348   Mon Jun 30, 2025   1330 Patient states medications helped his pain.  He states he is able to move his legs better at this time due to pain being better [MD]      ED Course User Index  [MD] Billy June PA-C       Labs:    Lab Results (last 24 hours)       ** No results found for the last 24 hours. **             Imaging:    MRI Lumbar Spine Without Contrast  Result Date: 6/30/2025  MRI LUMBAR SPINE WO CONTRAST Date of Exam: 6/30/2025 12:51 PM EDT Indication: Acute low back pain with bilateral extremity weakness.  Comparison: 2/14/2023 Technique:  Routine multiplanar/multisequence sequence images of the lumbar spine were obtained without contrast administration.  Findings: ALIGNMENT: There is 12 mm of anterior listhesis of L5 on S1 where there has been posterior fusion utilizing screw and juvencio fixation apparatus. There is associated susceptibility artifact. DISK SPACE: Mild to moderate spondylosis most pronounced at L5/S1. VERTEBRA: No acute fracture or destructive process. Slight chronic superior endplate compression fracture of L4 is similar to prior examination. No marrow edema.  No significant active degenerative endplate change. There are fatty degenerative endplate changes at the elbow 5/S1 level. CORD: Distal spinal cord and conus medullaris appear unremarkable terminating above the L2 level.  SOFT TISSUES: Paraspinal musculature and visualized abdominopelvic contents are unremarkable.  No mass nor lymphadenopathy. LEVELS: T12-L1: No focal disc protrusion or extrusion. Facet joints appear unremarkable. No significant neural foraminal or spinal canal stenosis. L1-L2: No focal disc protrusion or extrusion. Facet joints appear unremarkable. No significant neural foraminal or spinal canal stenosis. L2-L3: No focal disc protrusion or extrusion. Facet joints appear  unremarkable. No significant neural foraminal or spinal canal stenosis. L3-L4: Circumferential disc bulge and endplate osteophyte formation eccentric to the left with superimposed left central disc extrusion in continuity with the disc space coursing posterior to the L3 vertebral body measuring 9 mm in CC dimension by 5 mm in AP dimension by 6 mm in transverse dimension. There is minimal facet arthropathy and ligamentum flavum thickening. There is worsening moderate left with more mild right lateral recess and neural foraminal stenosis with potential contact of the transiting left L4 nerve. There is mild neuroforaminal stenosis. L4-L5: Circumferential disc bulge with slight central posterior disc extrusion in continuity with the disc space coursing posterior to the L4 vertebral body measuring 7 mm in CC dimension by 6 mm in transverse dimension by 3 mm in AP dimension. There is minimal facet arthropathy. There is minimal lateral recess and neuroforaminal stenosis. L5-S1: Previously mentioned grade 2 spondylolisthesis with interval posterior fusion. There is unroofing of the disc. Persistent moderate to severe neuroforaminal stenosis with likely exiting nerve contact bilaterally. No significant central canal nor lateral recess stenosis.     Impression: 1.Interval postoperative changes at L5/S1 where there is persistent grade 2 spondylolisthesis and moderate to severe neuroforaminal stenosis. 2.Slightly more pronounced discogenic change at the L3/4 level where there is potential contact of the transiting left L4 nerve. Correlate with symptoms. Electronically Signed: Lucas Garcia MD  6/30/2025 1:01 PM EDT  Workstation ID: [id number]        Differential Diagnosis and Discussion:    Back Pain: The patient presents with back pain. My differential diagnosis includes but is not limited to acute spinal epidural abscess, acute spinal epidural bleed, cauda equina syndrome, abdominal aortic aneurysm, aortic dissection, kidney  stone, pyelonephritis, musculoskeletal back pain, spinal fracture, and osteoarthritis.     PROCEDURES:    MRI was performed in the emergency department and the MRI impression was interpreted by me.     No orders to display       Procedures    MDM  Number of Diagnoses or Management Options  Acute on chronic back pain  Diagnosis management comments: Patient presented to the emergency department today for acute on chronic back pain with lower extremity pain and weakness.  Patient did not have any new fall or injury.  MRI was completed emergency department which noted chronic postoperative changes with persistent spondylolisthesis and neuroforaminal stenosis.  There is a discogenic change at the L3-L4 level that could be transiting the left L4 nerve.  No acute findings of cauda equina patient's symptoms improved while in the emergency department.  Will discharge patient home with Toradol, prednisone, Percocet with follow-up guidelines and return to the emergency department guidelines.       Amount and/or Complexity of Data Reviewed  Tests in the radiology section of CPT®: reviewed and ordered    Risk of Complications, Morbidity, and/or Mortality  Presenting problems: moderate  Diagnostic procedures: moderate  Management options: low    Patient Progress  Patient progress: stable       Patient Care Considerations:    CONSULT: I considered consulting neurosurgery, however there is no acute cauda equina findings      Consultants/Shared Management Plan:    SHARED VISIT: I have discussed the case with my supervising physician, Dr. Madden who states patient appropriate for outpatient treatment and follow-up. The substantive portion of the medical decision was made by the attesting physician who made or approve the management plan and will take responsibility for the patient.  Clinical findings were discussed and ultimate disposition was made in consult with supervising physician.    Social Determinants of Health:    Patient is  independent, reliable, and has access to care.       Disposition and Care Coordination:    Discharged: The patient is suitable and stable for discharge with no need for consideration of admission.    I have explained the patient´s condition, diagnoses and treatment plan based on the information available to me at this time. I have answered questions and addressed any concerns. The patient has a good  understanding of the patient´s diagnosis, condition, and treatment plan as can be expected at this point. The vital signs have been stable. The patient´s condition is stable and appropriate for discharge from the emergency department.      The patient will pursue further outpatient evaluation with the primary care physician or other designated or consulting physician as outlined in the discharge instructions. They are agreeable to this plan of care and follow-up instructions have been explained in detail. The patient has received these instructions in written format and has expressed an understanding of the discharge instructions. The patient is aware that any significant change in condition or worsening of symptoms should prompt an immediate return to this or the closest emergency department or call to 911.  I have explained discharge medications and the need for follow up with the patient/caretakers. This was also printed in the discharge instructions. Patient was discharged with the following medications and follow up:      Medication List        Changed      * ketorolac 10 MG tablet  Commonly known as: TORADOL  Take 1 tablet by mouth Every 6 (Six) Hours As Needed for Moderate Pain.  What changed: Another medication with the same name was added. Make sure you understand how and when to take each.     * ketorolac 10 MG tablet  Commonly known as: TORADOL  Take 1 tablet by mouth Every 6 (Six) Hours As Needed for Moderate Pain.  What changed: You were already taking a medication with the same name, and this prescription  was added. Make sure you understand how and when to take each.     * predniSONE 20 MG tablet  Commonly known as: DELTASONE  Take 1 tablet by mouth Daily.  What changed: Another medication with the same name was added. Make sure you understand how and when to take each.     * predniSONE 20 MG tablet  Commonly known as: DELTASONE  Take 1 tablet by mouth 2 (Two) Times a Day for 2 days, THEN 1 tablet Daily for 2 days, THEN 0.5 tablets Daily for 2 days.  Start taking on: June 30, 2025  What changed: You were already taking a medication with the same name, and this prescription was added. Make sure you understand how and when to take each.           * This list has 4 medication(s) that are the same as other medications prescribed for you. Read the directions carefully, and ask your doctor or other care provider to review them with you.                   Where to Get Your Medications        These medications were sent to [address], [place] - [address] [place] [address] - [phone] [facility] [phone]   [address] [facility] [place] [address]      Phone: [phone]   ketorolac 10 MG tablet  predniSONE 20 MG tablet      Provider, No Known  [facility]  [place] [address]             Final diagnoses:   Acute on chronic back pain        ED Disposition       ED Disposition   Discharge    Condition   Stable    Comment   --               This medical record created using voice recognition software.             Billy Jnue PA-C  06/30/25 [id number]

## 2025-06-30 NOTE — ED NOTES
[facility] established an IV and administered 100 mcg. of Fentanyl en route to the ED, patient has a history of L4 and L5 surgery